# Patient Record
Sex: FEMALE | Race: WHITE | Employment: FULL TIME | RURAL
[De-identification: names, ages, dates, MRNs, and addresses within clinical notes are randomized per-mention and may not be internally consistent; named-entity substitution may affect disease eponyms.]

---

## 2022-05-09 ENCOUNTER — OFFICE VISIT (OUTPATIENT)
Dept: FAMILY MEDICINE CLINIC | Age: 37
End: 2022-05-09
Payer: COMMERCIAL

## 2022-05-09 ENCOUNTER — APPOINTMENT (OUTPATIENT)
Dept: FAMILY MEDICINE CLINIC | Age: 37
End: 2022-05-09

## 2022-05-09 VITALS
HEART RATE: 75 BPM | RESPIRATION RATE: 16 BRPM | HEIGHT: 66 IN | SYSTOLIC BLOOD PRESSURE: 146 MMHG | TEMPERATURE: 97.2 F | WEIGHT: 235 LBS | OXYGEN SATURATION: 98 % | DIASTOLIC BLOOD PRESSURE: 98 MMHG | BODY MASS INDEX: 37.77 KG/M2

## 2022-05-09 DIAGNOSIS — Z76.89 ESTABLISHING CARE WITH NEW DOCTOR, ENCOUNTER FOR: ICD-10-CM

## 2022-05-09 DIAGNOSIS — B37.2 YEAST DERMATITIS: ICD-10-CM

## 2022-05-09 DIAGNOSIS — I10 PRIMARY HYPERTENSION: Primary | ICD-10-CM

## 2022-05-09 DIAGNOSIS — Z11.59 ENCOUNTER FOR HEPATITIS C SCREENING TEST FOR LOW RISK PATIENT: ICD-10-CM

## 2022-05-09 DIAGNOSIS — R53.83 FATIGUE, UNSPECIFIED TYPE: ICD-10-CM

## 2022-05-09 DIAGNOSIS — Z13.29 SCREENING FOR THYROID DISORDER: ICD-10-CM

## 2022-05-09 PROCEDURE — 99204 OFFICE O/P NEW MOD 45 MIN: CPT | Performed by: NURSE PRACTITIONER

## 2022-05-09 RX ORDER — CETIRIZINE HYDROCHLORIDE 10 MG/1
CAPSULE, LIQUID FILLED ORAL
COMMUNITY

## 2022-05-09 RX ORDER — LISINOPRIL 20 MG/1
20 TABLET ORAL DAILY
Qty: 90 TABLET | Refills: 0 | Status: SHIPPED | OUTPATIENT
Start: 2022-05-09

## 2022-05-09 RX ORDER — NYSTATIN 100000 U/G
OINTMENT TOPICAL 2 TIMES DAILY
Qty: 15 G | Refills: 0 | Status: SHIPPED | OUTPATIENT
Start: 2022-05-09

## 2022-05-09 NOTE — PROGRESS NOTES
Identified pt with two pt identifiers(name and ). Chief Complaint   Patient presents with    New Patient    Other     pt thinks she has infection in umbilical area.  Hypertension     per hospital         Health Maintenance Due   Topic    Hepatitis C Screening     Depression Screen     COVID-19 Vaccine (1)    DTaP/Tdap/Td series (1 - Tdap)    Cervical cancer screen        Wt Readings from Last 3 Encounters:   22 235 lb (106.6 kg)   10/05/15 195 lb (88.5 kg)   14 194 lb 5 oz (88.1 kg)     Temp Readings from Last 3 Encounters:   22 97.2 °F (36.2 °C) (Temporal)   10/05/15 98 °F (36.7 °C)   14 98 °F (36.7 °C)     BP Readings from Last 3 Encounters:   22 (!) 146/98   10/05/15 135/81   14 136/66     Pulse Readings from Last 3 Encounters:   22 75   10/05/15 65   14 68         Learning Assessment:  :     No flowsheet data found. Depression Screening:  :     3 most recent PHQ Screens 2022   Little interest or pleasure in doing things Not at all   Feeling down, depressed, irritable, or hopeless Not at all   Total Score PHQ 2 0       Fall Risk Assessment:  :     No flowsheet data found. Abuse Screening:  :     Abuse Screening Questionnaire 2022   Do you ever feel afraid of your partner? N   Are you in a relationship with someone who physically or mentally threatens you? N   Is it safe for you to go home? Y       Coordination of Care Questionnaire:  :     1) Have you been to an emergency room, urgent care clinic since your last visit? yes [facility] 1.5 months ago  for umbilical infection   Hospitalized since your last visit? no             2) Have you seen or consulted any other health care providers outside of [address] [place] [address] since your last visit? no  (Include any pap smears or colon screenings in this section.)    3) Do you have an Advance Directive on file? no  Are you interested in receiving information about Advance Directives? no    Patient is accompanied by N/A I have received verbal consent from Naheed Snider to discuss any/all medical information while they are present in the room. 4.  For patients aged 39-70: Has the patient had a colonoscopy / FIT/ Cologuard? NA - based on age      If the patient is female:    11. For patients aged 41-77: Has the patient had a mammogram within the past 2 years? N/A      6. For patients aged 21-65: Has the patient had a pap smear?  No

## 2022-05-09 NOTE — PROGRESS NOTES
HISTORY OF PRESENT ILLNESS  Armand Cannon is a 40 y.o. female. HPI  Pt presents as a new patient to establish care  Has not seen PCP in years    Pt states that she has had belly button infection since January  She states that at times the area is itchy and other times it is painful  It has had some oozing at times  She was seen in the ER last month for this, and an US was performed. They stated that everything was stable. She was found to have elevated BP at the ER. BP is noted to be elevated here as well. She has never been on BP medication in the past  She states that cardiac disease runs on both her mother and fathers side. She denies chest pain or shortness of breath, but does note constant fatigue  Review of Systems   Constitutional: Negative for fever. Physical Exam  Constitutional:       Appearance: Normal appearance. Cardiovascular:      Rate and Rhythm: Normal rate and regular rhythm. Heart sounds: Normal heart sounds. Pulmonary:      Effort: Pulmonary effort is normal.      Breath sounds: Normal breath sounds. Abdominal:       Neurological:      Mental Status: She is alert. Psychiatric:         Mood and Affect: Mood normal.         Behavior: Behavior normal.         ASSESSMENT and PLAN    ICD-10-CM ICD-9-CM    1. Primary hypertension  I10 401.9 CBC W/O DIFF      METABOLIC PANEL, COMPREHENSIVE      lisinopriL (PRINIVIL, ZESTRIL) 20 mg tablet      CBC W/O DIFF      METABOLIC PANEL, COMPREHENSIVE   2. Establishing care with new doctor, encounter for  Z76.89 V65.8    3. Yeast dermatitis  B37.2 112.3 nystatin (MYCOSTATIN) 100,000 unit/gram ointment   4. Screening for thyroid disorder  Z13.29 V77.0 THYROID CASCADE PROFILE      THYROID CASCADE PROFILE   5. Encounter for hepatitis C screening test for low risk patient  Z11.59 V73.89 HEPATITIS C AB      HEPATITIS C AB   6.  Fatigue, unspecified type  R53.83 780.79 THYROID CASCADE PROFILE      THYROID CASCADE PROFILE     Educated about taking medications as prescribed  Will notify when labs return, and inform her of any change in plan of care at that time  Educated about returning in 2-4 weeks for BP re-check    Pt informed to return to office with worsening of symptoms, or PRN with any questions or concerns. Pt verbalizes understanding of plan of care and denies further questions or concerns at this time.

## 2022-05-10 ENCOUNTER — TELEPHONE (OUTPATIENT)
Dept: FAMILY MEDICINE CLINIC | Age: 37
End: 2022-05-10

## 2022-05-10 LAB
ALBUMIN SERPL-MCNC: 4.1 G/DL (ref 3.5–5)
ALBUMIN/GLOB SERPL: 1.2 {RATIO} (ref 1.1–2.2)
ALP SERPL-CCNC: 55 U/L (ref 45–117)
ALT SERPL-CCNC: 25 U/L (ref 12–78)
ANION GAP SERPL CALC-SCNC: 1 MMOL/L (ref 5–15)
AST SERPL-CCNC: 15 U/L (ref 15–37)
BILIRUB SERPL-MCNC: 0.4 MG/DL (ref 0.2–1)
BUN SERPL-MCNC: 11 MG/DL (ref 6–20)
BUN/CREAT SERPL: 19 (ref 12–20)
CALCIUM SERPL-MCNC: 10 MG/DL (ref 8.5–10.1)
CHLORIDE SERPL-SCNC: 106 MMOL/L (ref 97–108)
CO2 SERPL-SCNC: 30 MMOL/L (ref 21–32)
CREAT SERPL-MCNC: 0.57 MG/DL (ref 0.55–1.02)
ERYTHROCYTE [DISTWIDTH] IN BLOOD BY AUTOMATED COUNT: 13.1 % (ref 11.5–14.5)
GLOBULIN SER CALC-MCNC: 3.3 G/DL (ref 2–4)
GLUCOSE SERPL-MCNC: 85 MG/DL (ref 65–100)
HCT VFR BLD AUTO: 44.9 % (ref 35–47)
HCV AB SERPL QL IA: NONREACTIVE
HGB BLD-MCNC: 14.4 G/DL (ref 11.5–16)
MCH RBC QN AUTO: 32.6 PG (ref 26–34)
MCHC RBC AUTO-ENTMCNC: 32.1 G/DL (ref 30–36.5)
MCV RBC AUTO: 101.6 FL (ref 80–99)
NRBC # BLD: 0 K/UL (ref 0–0.01)
NRBC BLD-RTO: 0 PER 100 WBC
PLATELET # BLD AUTO: 268 K/UL (ref 150–400)
PMV BLD AUTO: 9.8 FL (ref 8.9–12.9)
POTASSIUM SERPL-SCNC: 4 MMOL/L (ref 3.5–5.1)
PROT SERPL-MCNC: 7.4 G/DL (ref 6.4–8.2)
RBC # BLD AUTO: 4.42 M/UL (ref 3.8–5.2)
SODIUM SERPL-SCNC: 137 MMOL/L (ref 136–145)
WBC # BLD AUTO: 7.8 K/UL (ref 3.6–11)

## 2022-05-10 NOTE — TELEPHONE ENCOUNTER
----- Message from Solange Segundo NP sent at 5/10/2022  8:13 AM EDT -----  Please call patient and let her know that her labs returned stable.   Should return in 2-4 weeks for BP re-check  Thanks

## 2022-05-10 NOTE — PROGRESS NOTES
Please call patient and let her know that her labs returned stable.   Should return in 2-4 weeks for BP re-check  Thanks

## 2022-05-11 LAB — TSH SERPL-ACNC: 1.58 UIU/ML (ref 0.45–4.5)

## 2023-05-08 ENCOUNTER — OFFICE VISIT (OUTPATIENT)
Age: 38
End: 2023-05-08
Payer: COMMERCIAL

## 2023-05-08 VITALS
OXYGEN SATURATION: 97 % | TEMPERATURE: 98.6 F | DIASTOLIC BLOOD PRESSURE: 90 MMHG | WEIGHT: 217 LBS | BODY MASS INDEX: 34.87 KG/M2 | HEIGHT: 66 IN | SYSTOLIC BLOOD PRESSURE: 142 MMHG | HEART RATE: 80 BPM

## 2023-05-08 DIAGNOSIS — R07.89 CHEST DISCOMFORT: ICD-10-CM

## 2023-05-08 DIAGNOSIS — I10 UNCONTROLLED HYPERTENSION: Primary | ICD-10-CM

## 2023-05-08 DIAGNOSIS — I51.7 LAE (LEFT ATRIAL ENLARGEMENT): ICD-10-CM

## 2023-05-08 DIAGNOSIS — L30.4 INTERTRIGO: ICD-10-CM

## 2023-05-08 PROCEDURE — 3080F DIAST BP >= 90 MM HG: CPT | Performed by: INTERNAL MEDICINE

## 2023-05-08 PROCEDURE — 99214 OFFICE O/P EST MOD 30 MIN: CPT | Performed by: INTERNAL MEDICINE

## 2023-05-08 PROCEDURE — 93005 ELECTROCARDIOGRAM TRACING: CPT | Performed by: INTERNAL MEDICINE

## 2023-05-08 PROCEDURE — 93010 ELECTROCARDIOGRAM REPORT: CPT | Performed by: INTERNAL MEDICINE

## 2023-05-08 PROCEDURE — 3077F SYST BP >= 140 MM HG: CPT | Performed by: INTERNAL MEDICINE

## 2023-05-08 RX ORDER — LISINOPRIL 20 MG/1
20 TABLET ORAL DAILY
Qty: 30 TABLET | Refills: 5 | Status: SHIPPED | OUTPATIENT
Start: 2023-05-08

## 2023-05-08 RX ORDER — NYSTATIN 100000 [USP'U]/G
POWDER TOPICAL
Qty: 60 G | Refills: 1 | Status: SHIPPED | OUTPATIENT
Start: 2023-05-08

## 2023-05-08 NOTE — PROGRESS NOTES
Identified pt with two pt identifiers(name and ). Reviewed record in preparation for visit and have obtained necessary documentation. All patient medications has been reviewed. Chief Complaint   Patient presents with    Hypertension     Pt was seen today by her dentist and told her BP was too elevated to have her dental procedures done. Vitals:    23 1316   BP: (!) 142/90   Pulse: 80   Temp: 98.6 °F (37 °C)   SpO2: 97%                   Coordination of Care Questionnaire:   1) Have you been to an emergency room, urgent care, or hospitalized since your last visit?   no       2.  Have seen or consulted any other health care provider since your last visit? no

## 2023-05-08 NOTE — PROGRESS NOTES
Chief Complaint   Patient presents with    Hypertension     Pt was seen today by her dentist and told her BP was too elevated to have her dental procedures done. Assessment/Plan:   Dora Harrison was seen today for hypertension. Diagnoses and all orders for this visit:    Uncontrolled hypertension  -     Cancel: AMB POC EXTERNAL PT ACTIVTD ECG DWNLD W/R&I </30 DAYS  -     EKG 12 Lead; Future  -     EKG 12 Lead  -     lisinopril (PRINIVIL;ZESTRIL) 20 MG tablet; Take 1 tablet by mouth daily  -     Rene Cutler MD, Cardiology, [facility]  - Follow up with PCP for 3-week BP recheck. Chest discomfort  -     EKG 12 Lead; Future  -     LAE  -     Rene Cutler MD, Cardiology, [facility]    [employer]  -     nystatin (MYCOSTATIN) 912575 UNIT/GM powder; Apply 3 times daily. LAE (left atrial enlargement)  -     Rene Cutler MD, Cardiology, [facility]    Will need to recheck labs at next visit in 3-weeks. PCP to order    I have discussed the diagnosis with the patient and the intended treatment plan as seen in the above orders. The patient has received an after-visit summary and questions were answered concerning future plans. Asked to return should symptoms worsen or not improve with treatment. Any pending labs and studies will be relayed to patient when they become available. Pt verbalizes understanding of plan of care and denies further questions or concerns at this time. Return in about 3 weeks (around 5/29/2023), or if symptoms worsen or fail to improve, for Check BP. Subjective:     Erlinda Sharma is a 45 y.o. female who presents for follow up of hypertension. Apparently, she was having a dental procedure done next door and found to have a /100. In review of the record, she was seen for the first time by NP, Paulette, who assessed her to have hypertension and was given a prescribing for Lisinopril.  The patient wanted to try lifestyle modifications including loosing weight,

## 2023-05-23 ENCOUNTER — OFFICE VISIT (OUTPATIENT)
Age: 38
End: 2023-05-23
Payer: COMMERCIAL

## 2023-05-23 ENCOUNTER — TELEPHONE (OUTPATIENT)
Age: 38
End: 2023-05-23

## 2023-05-23 VITALS
HEART RATE: 70 BPM | OXYGEN SATURATION: 99 % | HEIGHT: 66 IN | WEIGHT: 220 LBS | DIASTOLIC BLOOD PRESSURE: 84 MMHG | BODY MASS INDEX: 35.36 KG/M2 | SYSTOLIC BLOOD PRESSURE: 130 MMHG

## 2023-05-23 DIAGNOSIS — R00.2 HEART PALPITATIONS: ICD-10-CM

## 2023-05-23 DIAGNOSIS — R06.02 SOB (SHORTNESS OF BREATH): ICD-10-CM

## 2023-05-23 DIAGNOSIS — R07.9 CHEST PAIN, UNSPECIFIED TYPE: ICD-10-CM

## 2023-05-23 DIAGNOSIS — I49.8 FLUTTERING HEART: ICD-10-CM

## 2023-05-23 DIAGNOSIS — R07.9 CHEST PAIN, UNSPECIFIED TYPE: Primary | ICD-10-CM

## 2023-05-23 DIAGNOSIS — Z98.890 H/O CARDIAC RADIOFREQUENCY ABLATION: ICD-10-CM

## 2023-05-23 PROCEDURE — 99204 OFFICE O/P NEW MOD 45 MIN: CPT | Performed by: SPECIALIST

## 2023-05-23 PROCEDURE — 93000 ELECTROCARDIOGRAM COMPLETE: CPT | Performed by: SPECIALIST

## 2023-05-23 ASSESSMENT — ENCOUNTER SYMPTOMS: SHORTNESS OF BREATH: 1

## 2023-05-23 NOTE — TELEPHONE ENCOUNTER
Enrolled with [employer] - Ordered and being shipped to patient's home address on file. ETA within 7-14 business days. Message  Received:  Today  Efraín Goodman RN  [facility]; Venus Simons  Please order a 30 day monitor per Dr. Gildardo Nolasco dx: palps     Thank you   Caitie

## 2023-05-23 NOTE — PATIENT INSTRUCTIONS
Please have your labs drawn    We have ordered you a 30-day heart monitor. It will be mailed to your home address.     We will call you with the results

## 2023-05-23 NOTE — PROGRESS NOTES
HISTORY OF PRESENT ILLNESS  Lesly Tristan is a 45 y.o. female   She is referred for palpitations. She has a history of ablation done 8 years ago presumably at [facility] for rapid heart rate. She is now having palpitations which can be quite bad for 1 day at a time about 1 day/week. She also admits to anxiety. Recently she was going to the dentist and had high blood pressure and was started on lisinopril 20 mg but she only took it for 1 day because she was anxious and she has been checking her pressure since and it has been fine. She works doing . She does not smoke cigarettes or drink alcohol or drink caffeine. She does not have chest pain or shortness of breath. Labs done 1 year ago were unremarkable. Shortness of Breath  Associated symptoms include headaches. Palpitations   Associated symptoms include anxiety and shortness of breath. Headache     Patient Active Problem List   Diagnosis    Heart palpitations    H/O cardiac radiofrequency ablation     Current Outpatient Medications   Medication Sig Dispense Refill    Acetaminophen (TYLENOL 8 HOUR PO) Take by mouth      Cetirizine HCl (ZYRTEC ALLERGY) 10 MG CAPS Take by mouth      nystatin (MYCOSTATIN) 209640 UNIT/GM powder Apply 3 times daily. (Patient not taking: Reported on 5/23/2023) 60 g 1    aspirin-acetaminophen-caffeine (EXCEDRIN MIGRAINE) 913-887-27 MG per tablet Take 1 tablet by mouth every 6 hours as needed (Patient not taking: Reported on 5/8/2023)       No current facility-administered medications for this visit.      Allergies   Allergen Reactions    Flumazenil Seizure    Nitrofurantoin Hives    Sulfa Antibiotics Hives     Past Medical History:   Diagnosis Date    Arrhythmia     SVT and A fib    Cancer ([facility] [address]     cervical CA    Hypertension     Psychiatric disorder     anxiety and depression    Seizures ([facility] [address]     medication caused seizure that was given to wake up from anesthesia    Unspecified adverse effect of anesthesia

## 2023-05-24 LAB
ANION GAP SERPL CALC-SCNC: 5 MMOL/L (ref 5–15)
BUN SERPL-MCNC: 12 MG/DL (ref 6–20)
BUN/CREAT SERPL: 20 (ref 12–20)
CALCIUM SERPL-MCNC: 8.4 MG/DL (ref 8.5–10.1)
CHLORIDE SERPL-SCNC: 107 MMOL/L (ref 97–108)
CO2 SERPL-SCNC: 26 MMOL/L (ref 21–32)
CREAT SERPL-MCNC: 0.61 MG/DL (ref 0.55–1.02)
ERYTHROCYTE [DISTWIDTH] IN BLOOD BY AUTOMATED COUNT: 12.4 % (ref 11.5–14.5)
GLUCOSE SERPL-MCNC: 91 MG/DL (ref 65–100)
HCT VFR BLD AUTO: 41.7 % (ref 35–47)
HGB BLD-MCNC: 13.4 G/DL (ref 11.5–16)
MCH RBC QN AUTO: 31.5 PG (ref 26–34)
MCHC RBC AUTO-ENTMCNC: 32.1 G/DL (ref 30–36.5)
MCV RBC AUTO: 97.9 FL (ref 80–99)
NRBC # BLD: 0 K/UL (ref 0–0.01)
NRBC BLD-RTO: 0 PER 100 WBC
PLATELET # BLD AUTO: 249 K/UL (ref 150–400)
PMV BLD AUTO: 10.2 FL (ref 8.9–12.9)
POTASSIUM SERPL-SCNC: 3.8 MMOL/L (ref 3.5–5.1)
RBC # BLD AUTO: 4.26 M/UL (ref 3.8–5.2)
SODIUM SERPL-SCNC: 138 MMOL/L (ref 136–145)
TSH SERPL DL<=0.05 MIU/L-ACNC: 1.89 UIU/ML (ref 0.36–3.74)
WBC # BLD AUTO: 6.1 K/UL (ref 3.6–11)